# Patient Record
Sex: FEMALE | Race: WHITE | NOT HISPANIC OR LATINO | URBAN - METROPOLITAN AREA
[De-identification: names, ages, dates, MRNs, and addresses within clinical notes are randomized per-mention and may not be internally consistent; named-entity substitution may affect disease eponyms.]

---

## 2020-10-01 ENCOUNTER — OFFICE VISIT (OUTPATIENT)
Dept: URBAN - METROPOLITAN AREA CLINIC 121 | Facility: CLINIC | Age: 70
End: 2020-10-01

## 2020-10-14 ENCOUNTER — OFFICE VISIT (OUTPATIENT)
Dept: URBAN - METROPOLITAN AREA CLINIC 63 | Facility: CLINIC | Age: 70
End: 2020-10-14

## 2020-11-20 ENCOUNTER — OFFICE VISIT (OUTPATIENT)
Dept: URBAN - METROPOLITAN AREA SURGERY CENTER 4 | Facility: SURGERY CENTER | Age: 70
End: 2020-11-20

## 2020-11-24 LAB — PATHOLOGY (INDENTED REPORT): (no result)

## 2020-12-10 ENCOUNTER — OFFICE VISIT (OUTPATIENT)
Dept: URBAN - METROPOLITAN AREA CLINIC 63 | Facility: CLINIC | Age: 70
End: 2020-12-10

## 2021-02-16 ENCOUNTER — OFFICE VISIT (OUTPATIENT)
Dept: URBAN - METROPOLITAN AREA CLINIC 63 | Facility: CLINIC | Age: 71
End: 2021-02-16

## 2021-05-18 ENCOUNTER — OFFICE VISIT (OUTPATIENT)
Dept: URBAN - METROPOLITAN AREA CLINIC 63 | Facility: CLINIC | Age: 71
End: 2021-05-18

## 2022-07-09 ENCOUNTER — TELEPHONE ENCOUNTER (OUTPATIENT)
Dept: URBAN - METROPOLITAN AREA CLINIC 121 | Facility: CLINIC | Age: 72
End: 2022-07-09

## 2022-07-09 RX ORDER — CLONAZEPAM 1 MG/1
TABLET, ORALLY DISINTEGRATING ORAL ONCE A DAY
Refills: 0 | OUTPATIENT
Start: 2018-06-04 | End: 2018-06-04

## 2022-07-09 RX ORDER — CLONAZEPAM 1 MG/1
TABLET ORAL
Refills: 0 | OUTPATIENT
Start: 2020-10-05 | End: 2020-10-14

## 2022-07-09 RX ORDER — RIZATRIPTAN BENZOATE 10 MG/1
TABLET ORAL
Refills: 0 | OUTPATIENT
Start: 2018-05-09 | End: 2018-06-04

## 2022-07-09 RX ORDER — FERROUS SULFATE 137(45) MG
TABLET, EXTENDED RELEASE ORAL ONCE A DAY
Refills: 0 | OUTPATIENT
Start: 2018-06-04 | End: 2018-08-27

## 2022-07-09 RX ORDER — CLONAZEPAM 1 MG/1
TABLET, ORALLY DISINTEGRATING ORAL
Refills: 0 | OUTPATIENT
Start: 2018-04-30 | End: 2018-06-04

## 2022-07-09 RX ORDER — AMITRIPTYLINE HYDROCHLORIDE 25 MG/1
TABLET, FILM COATED ORAL
Refills: 0 | OUTPATIENT
Start: 2020-08-07 | End: 2020-10-14

## 2022-07-09 RX ORDER — ROSUVASTATIN CALCIUM 20 MG/1
TABLET, FILM COATED ORAL
Refills: 0 | OUTPATIENT
Start: 2020-08-11 | End: 2020-10-14

## 2022-07-09 RX ORDER — CLONAZEPAM 1 MG/1
TABLET, ORALLY DISINTEGRATING ORAL ONCE A DAY
Refills: 0 | OUTPATIENT
Start: 2018-08-27 | End: 2020-10-13

## 2022-07-09 RX ORDER — ATORVASTATIN CALCIUM 20 MG/1
TABLET, FILM COATED ORAL
Refills: 0 | OUTPATIENT
Start: 2018-04-29 | End: 2018-06-04

## 2022-07-09 RX ORDER — DULOXETINE HCL 30 MG
CAPSULE,DELAYED RELEASE (ENTERIC COATED) ORAL ONCE A DAY
Refills: 0 | OUTPATIENT
Start: 2018-06-04 | End: 2018-08-27

## 2022-07-09 RX ORDER — FERROUS SULFATE 137(45) MG
TABLET, EXTENDED RELEASE ORAL ONCE A DAY
Refills: 0 | OUTPATIENT
Start: 2018-08-27 | End: 2020-10-14

## 2022-07-09 RX ORDER — OMEPRAZOLE 40 MG/1
CAPSULE, DELAYED RELEASE ORAL TWICE A DAY
Refills: 0 | OUTPATIENT
Start: 2018-06-04 | End: 2018-08-27

## 2022-07-09 RX ORDER — RIZATRIPTAN BENZOATE 10 MG/1
TABLET ORAL AS NEEDED
Refills: 0 | OUTPATIENT
Start: 2018-08-27 | End: 2020-10-14

## 2022-07-09 RX ORDER — FAMOTIDINE 20 MG/1
ONCE A DAY AT NIGHT TABLET ORAL ONCE A DAY
Refills: 2 | OUTPATIENT
Start: 2020-12-10 | End: 2021-03-08

## 2022-07-09 RX ORDER — RIZATRIPTAN BENZOATE 10 MG/1
TABLET ORAL AS NEEDED
Refills: 0 | OUTPATIENT
Start: 2018-06-04 | End: 2018-08-27

## 2022-07-09 RX ORDER — ESOMEPRAZOLE MAGNESIUM 40 MG/1
FOR SUSPENSION ORAL
Refills: 0 | OUTPATIENT
Start: 2020-09-23 | End: 2020-10-14

## 2022-07-09 RX ORDER — OMEPRAZOLE 40 MG/1
CAPSULE, DELAYED RELEASE ORAL TWICE A DAY
Refills: 0 | OUTPATIENT
Start: 2018-08-27 | End: 2020-10-14

## 2022-07-09 RX ORDER — CLONAZEPAM 1 MG/1
TABLET, ORALLY DISINTEGRATING ORAL ONCE A DAY
Refills: 0 | OUTPATIENT
Start: 2018-06-04 | End: 2018-08-27

## 2022-07-09 RX ORDER — ATORVASTATIN CALCIUM 20 MG/1
TABLET, FILM COATED ORAL ONCE A DAY
Refills: 0 | OUTPATIENT
Start: 2018-08-27 | End: 2020-10-14

## 2022-07-09 RX ORDER — AMITRIPTYLINE HYDROCHLORIDE 50 MG/1
TABLET, FILM COATED ORAL
Refills: 0 | OUTPATIENT
Start: 2020-09-17 | End: 2020-10-14

## 2022-07-09 RX ORDER — ATORVASTATIN CALCIUM 20 MG/1
TABLET, FILM COATED ORAL ONCE A DAY
Refills: 0 | OUTPATIENT
Start: 2018-06-04 | End: 2018-08-27

## 2022-07-09 RX ORDER — DULOXETINE HCL 30 MG
CAPSULE,DELAYED RELEASE (ENTERIC COATED) ORAL ONCE A DAY
Refills: 0 | OUTPATIENT
Start: 2018-08-27 | End: 2020-10-14

## 2022-07-10 ENCOUNTER — TELEPHONE ENCOUNTER (OUTPATIENT)
Dept: URBAN - METROPOLITAN AREA CLINIC 121 | Facility: CLINIC | Age: 72
End: 2022-07-10

## 2022-07-10 RX ORDER — CLONAZEPAM 1 MG/1
TABLET ORAL
Refills: 0 | Status: ACTIVE | COMMUNITY
Start: 2020-10-14

## 2022-07-10 RX ORDER — FAMOTIDINE 20 MG/1
ONCE A DAY AT NIGHT TABLET ORAL ONCE A DAY
Refills: 2 | Status: ACTIVE | COMMUNITY
Start: 2021-03-08

## 2022-07-10 RX ORDER — TOPIRAMATE 50 MG/1
TABLET ORAL
Refills: 0 | Status: ACTIVE | COMMUNITY
Start: 2020-10-14

## 2022-07-10 RX ORDER — ROSUVASTATIN CALCIUM 20 MG/1
TABLET, FILM COATED ORAL
Refills: 0 | Status: ACTIVE | COMMUNITY
Start: 2020-10-14

## 2022-07-10 RX ORDER — ESOMEPRAZOLE MAGNESIUM 40 MG/1
FOR SUSPENSION ORAL
Refills: 0 | Status: ACTIVE | COMMUNITY
Start: 2020-10-14

## 2023-01-20 ENCOUNTER — OFFICE VISIT (OUTPATIENT)
Dept: URBAN - METROPOLITAN AREA CLINIC 60 | Facility: CLINIC | Age: 73
End: 2023-01-20

## 2023-01-20 ENCOUNTER — WEB ENCOUNTER (OUTPATIENT)
Dept: URBAN - METROPOLITAN AREA CLINIC 60 | Facility: CLINIC | Age: 73
End: 2023-01-20

## 2023-01-20 ENCOUNTER — LAB OUTSIDE AN ENCOUNTER (OUTPATIENT)
Dept: URBAN - METROPOLITAN AREA CLINIC 60 | Facility: CLINIC | Age: 73
End: 2023-01-20

## 2023-01-20 ENCOUNTER — CLAIMS CREATED FROM THE CLAIM WINDOW (OUTPATIENT)
Dept: URBAN - METROPOLITAN AREA CLINIC 60 | Facility: CLINIC | Age: 73
End: 2023-01-20
Payer: MEDICARE

## 2023-01-20 ENCOUNTER — CLAIMS CREATED FROM THE CLAIM WINDOW (OUTPATIENT)
Dept: URBAN - METROPOLITAN AREA CLINIC 60 | Facility: CLINIC | Age: 73
End: 2023-01-20

## 2023-01-20 VITALS
HEART RATE: 68 BPM | HEIGHT: 63 IN | WEIGHT: 126.6 LBS | TEMPERATURE: 98.4 F | SYSTOLIC BLOOD PRESSURE: 112 MMHG | BODY MASS INDEX: 22.43 KG/M2 | OXYGEN SATURATION: 98 % | DIASTOLIC BLOOD PRESSURE: 60 MMHG

## 2023-01-20 DIAGNOSIS — K64.9 HEMORRHOIDS, UNSPECIFIED HEMORRHOID TYPE: ICD-10-CM

## 2023-01-20 DIAGNOSIS — K21.00 GASTROESOPHAGEAL REFLUX DISEASE WITH ESOPHAGITIS WITHOUT HEMORRHAGE: ICD-10-CM

## 2023-01-20 DIAGNOSIS — Z86.010 PERSONAL HISTORY OF COLONIC POLYPS: ICD-10-CM

## 2023-01-20 DIAGNOSIS — K22.70 BARRETT'S ESOPHAGUS WITHOUT DYSPLASIA: ICD-10-CM

## 2023-01-20 DIAGNOSIS — K58.2 IRRITABLE BOWEL SYNDROME WITH BOTH CONSTIPATION AND DIARRHEA: ICD-10-CM

## 2023-01-20 PROCEDURE — 99214 OFFICE O/P EST MOD 30 MIN: CPT | Performed by: PHYSICIAN ASSISTANT

## 2023-01-20 RX ORDER — FAMOTIDINE 20 MG/1
ONCE A DAY AT NIGHT TABLET ORAL ONCE A DAY
Refills: 2 | Status: ACTIVE | COMMUNITY
Start: 2021-03-08

## 2023-01-20 RX ORDER — ROSUVASTATIN CALCIUM 20 MG/1
TABLET, FILM COATED ORAL
Refills: 0 | Status: ACTIVE | COMMUNITY
Start: 2020-10-14

## 2023-01-20 RX ORDER — CLONAZEPAM 1 MG/1
TABLET ORAL
Refills: 0 | Status: ACTIVE | COMMUNITY
Start: 2020-10-14

## 2023-01-20 RX ORDER — PRENATAL 168/IRON/FOLIC/OMEGA3 27-800-235
AS DIRECTED CAPSULE ORAL
Status: ACTIVE | COMMUNITY

## 2023-01-20 RX ORDER — MULTIVITAMIN WITH IRON
1 TABLET WITH A MEAL TABLET ORAL ONCE A DAY
Status: ACTIVE | COMMUNITY

## 2023-01-20 RX ORDER — SERTRALINE 50 MG/1
1 TABLET TABLET, FILM COATED ORAL ONCE A DAY
Status: ACTIVE | COMMUNITY

## 2023-01-20 RX ORDER — ESOMEPRAZOLE MAGNESIUM 40 MG/1
FOR SUSPENSION ORAL
Refills: 0 | Status: ACTIVE | COMMUNITY
Start: 2020-10-14

## 2023-01-20 NOTE — HPI-TODAY'S VISIT:
72-year-old female with history of Valentin's esophagus, colon polyps, and family history of colon cancer in her mother presents to the office for evaluation of GERD symptoms. She has a history of chronic heartburn which became more severe in 2020 and she was started on Nexium 40 mg daily in the morning and famotidine 20 mg daily at night.    She stateas she has been having increased heartburn about 3 months ago. She saw her ENT who told her it was due to her reflux. States she has a lot of belching and bloating. She has heartburn on most days. States her throat is sore and she will also have some pain in her ears. She has no dysphagia. No unintentional weight loss.   She reports a history of IBS with alternating bowel habits. Sometimes she has constipation and will insert her finger into her vagina to "push out" her stools. She has no rectal bleeding. States she has tried fiber supplements in the past. She has occasional flares.   Her last EGD and colonoscopy were done in November 2020.  Her EGD demonstrated salmon-colored mucosa in the proximal esophagus consistent with gastric inlet patch.  [place]-colored mucosa was present at the GE junction.  Mildly erythematous mucosa was found in the stomach.  The duodenum appeared normal.  Her colonoscopy demonstrated grade 1 internal hemorrhoids, 4 mm tubular adenoma which was removed from the ascending colon, tortuous colon.

## 2023-01-31 ENCOUNTER — OFFICE VISIT (OUTPATIENT)
Dept: URBAN - METROPOLITAN AREA SURGERY CENTER 4 | Facility: SURGERY CENTER | Age: 73
End: 2023-01-31
Payer: MEDICARE

## 2023-01-31 ENCOUNTER — TELEPHONE ENCOUNTER (OUTPATIENT)
Dept: URBAN - METROPOLITAN AREA CLINIC 63 | Facility: CLINIC | Age: 73
End: 2023-01-31

## 2023-01-31 DIAGNOSIS — K22.70 BARRETT'S ESOPHAGUS WITHOUT DYSPLASIA: ICD-10-CM

## 2023-01-31 DIAGNOSIS — K29.70 GASTRITIS: ICD-10-CM

## 2023-01-31 PROCEDURE — 43239 EGD BIOPSY SINGLE/MULTIPLE: CPT | Performed by: INTERNAL MEDICINE

## 2023-01-31 RX ORDER — MULTIVITAMIN WITH IRON
1 TABLET WITH A MEAL TABLET ORAL ONCE A DAY
Status: ACTIVE | COMMUNITY

## 2023-01-31 RX ORDER — PRENATAL 168/IRON/FOLIC/OMEGA3 27-800-235
AS DIRECTED CAPSULE ORAL
Status: ACTIVE | COMMUNITY

## 2023-01-31 RX ORDER — ESOMEPRAZOLE MAGNESIUM 40 MG/1
FOR SUSPENSION ORAL
Refills: 0 | Status: ACTIVE | COMMUNITY
Start: 2020-10-14

## 2023-01-31 RX ORDER — FAMOTIDINE 20 MG/1
ONCE A DAY AT NIGHT TABLET ORAL ONCE A DAY
Refills: 2 | Status: ACTIVE | COMMUNITY
Start: 2021-03-08

## 2023-01-31 RX ORDER — PANTOPRAZOLE SODIUM 40 MG/1
1 TABLET TABLET, DELAYED RELEASE ORAL ONCE A DAY
Qty: 30 | Refills: 2 | OUTPATIENT
Start: 2023-01-31

## 2023-01-31 RX ORDER — CLONAZEPAM 1 MG/1
TABLET ORAL
Refills: 0 | Status: ACTIVE | COMMUNITY
Start: 2020-10-14

## 2023-01-31 RX ORDER — ROSUVASTATIN CALCIUM 20 MG/1
TABLET, FILM COATED ORAL
Refills: 0 | Status: ACTIVE | COMMUNITY
Start: 2020-10-14

## 2023-01-31 RX ORDER — SERTRALINE 50 MG/1
1 TABLET TABLET, FILM COATED ORAL ONCE A DAY
Status: ACTIVE | COMMUNITY

## 2023-02-01 PROBLEM — 4556007 GASTRITIS: Status: ACTIVE | Noted: 2023-02-01

## 2023-02-24 ENCOUNTER — OFFICE VISIT (OUTPATIENT)
Dept: URBAN - METROPOLITAN AREA CLINIC 60 | Facility: CLINIC | Age: 73
End: 2023-02-24
Payer: MEDICARE

## 2023-02-24 VITALS
WEIGHT: 130.6 LBS | HEIGHT: 63 IN | DIASTOLIC BLOOD PRESSURE: 63 MMHG | TEMPERATURE: 98 F | OXYGEN SATURATION: 97 % | SYSTOLIC BLOOD PRESSURE: 104 MMHG | BODY MASS INDEX: 23.14 KG/M2 | HEART RATE: 55 BPM | RESPIRATION RATE: 12 BRPM

## 2023-02-24 DIAGNOSIS — K58.2 IRRITABLE BOWEL SYNDROME WITH BOTH CONSTIPATION AND DIARRHEA: ICD-10-CM

## 2023-02-24 DIAGNOSIS — Z86.010 PERSONAL HISTORY OF COLONIC POLYPS: ICD-10-CM

## 2023-02-24 DIAGNOSIS — K22.70 BARRETT'S ESOPHAGUS WITHOUT DYSPLASIA: ICD-10-CM

## 2023-02-24 DIAGNOSIS — K21.00 GASTROESOPHAGEAL REFLUX DISEASE WITH ESOPHAGITIS WITHOUT HEMORRHAGE: ICD-10-CM

## 2023-02-24 PROBLEM — 428283002: Status: ACTIVE | Noted: 2023-01-20

## 2023-02-24 PROBLEM — 266433003: Status: ACTIVE | Noted: 2023-01-20

## 2023-02-24 PROBLEM — 10743008: Status: ACTIVE | Noted: 2023-01-20

## 2023-02-24 PROCEDURE — 99214 OFFICE O/P EST MOD 30 MIN: CPT | Performed by: PHYSICIAN ASSISTANT

## 2023-02-24 RX ORDER — PRENATAL 168/IRON/FOLIC/OMEGA3 27-800-235
AS DIRECTED CAPSULE ORAL
Status: ACTIVE | COMMUNITY

## 2023-02-24 RX ORDER — SERTRALINE 50 MG/1
1 TABLET TABLET, FILM COATED ORAL ONCE A DAY
Status: ACTIVE | COMMUNITY

## 2023-02-24 RX ORDER — RIZATRIPTAN BENZOATE 10 MG/1
TAKE ONE TABLET BY MOUTH ONE TIME DAILY FOR MIGRAINES TABLET ORAL
Qty: 18 UNSPECIFIED | Refills: 0 | Status: ACTIVE | COMMUNITY

## 2023-02-24 RX ORDER — MELOXICAM 7.5 MG/1
TAKE 1 TABLET BY MOUTH DAILY AS NEEDED FOR PAIN TABLET ORAL
Qty: 30 EACH | Refills: 1 | Status: ACTIVE | COMMUNITY

## 2023-02-24 RX ORDER — ROSUVASTATIN CALCIUM 20 MG/1
TABLET, FILM COATED ORAL
Refills: 0 | Status: ACTIVE | COMMUNITY
Start: 2020-10-14

## 2023-02-24 RX ORDER — TIZANIDINE 4 MG/1
TAKE 1 TABLET BY MOUTH EVERY NIGHT AT BEDTIME AS NEEDED FOR PAIN TABLET ORAL
Qty: 30 EACH | Refills: 0 | Status: ACTIVE | COMMUNITY

## 2023-02-24 RX ORDER — FLUTICASONE PROPIONATE 50 UG/1
SPRAY, METERED NASAL
Qty: 48 GRAM | Status: ACTIVE | COMMUNITY

## 2023-02-24 RX ORDER — MULTIVITAMIN WITH IRON
1 TABLET WITH A MEAL TABLET ORAL ONCE A DAY
Status: ACTIVE | COMMUNITY

## 2023-02-24 RX ORDER — CLONAZEPAM 1 MG/1
TABLET ORAL
Refills: 0 | Status: ACTIVE | COMMUNITY
Start: 2020-10-14

## 2023-02-24 RX ORDER — FAMOTIDINE 20 MG/1
ONCE A DAY AT NIGHT TABLET ORAL ONCE A DAY
Refills: 2 | Status: ACTIVE | COMMUNITY
Start: 2021-03-08

## 2023-02-24 RX ORDER — ALENDRONATE SODIUM 70 MG/1
TABLET ORAL
Qty: 12 TABLET | Status: ACTIVE | COMMUNITY

## 2023-02-24 RX ORDER — FLUCONAZOLE 150 MG/1
TAKE ONE TABLET BY MOUTH AS A SINGLE ORAL DOSE TABLET ORAL
Qty: 1 UNSPECIFIED | Refills: 0 | Status: ACTIVE | COMMUNITY

## 2023-02-24 RX ORDER — PANTOPRAZOLE SODIUM 40 MG/1
1 TABLET TABLET, DELAYED RELEASE ORAL TWICE A DAY
Qty: 60 | Refills: 6
Start: 2023-01-31

## 2023-02-24 RX ORDER — TOPIRAMATE 50 MG/1
TABLET, FILM COATED ORAL
Qty: 90 TABLET | Status: ACTIVE | COMMUNITY

## 2023-02-24 RX ORDER — HYDROXYZINE HYDROCHLORIDE 25 MG/1
TAKE ONE TABLET BY MOUTH AT BEDTIME TABLET, FILM COATED ORAL
Qty: 30 UNSPECIFIED | Refills: 0 | Status: ACTIVE | COMMUNITY

## 2023-02-24 RX ORDER — PANTOPRAZOLE SODIUM 40 MG/1
1 TABLET TABLET, DELAYED RELEASE ORAL ONCE A DAY
Qty: 30 | Refills: 2 | Status: ACTIVE | COMMUNITY
Start: 2023-01-31

## 2023-02-24 NOTE — HPI-TODAY'S VISIT:
72-year-old female with history of Valentin's esophagus, colon polyps, family history of colon cancer in her mother presented to the office last month for evaluation of GERD symptoms.  She reported a history of chronic heartburn which became more severe in 2020 and she was started on Nexium 40 mg daily in the morning and famotidine 40 mg at night.  Her heartburn worsened about 3 months ago.  She saw her ENT who told her it was due to reflux.  She reported a lot of belching and bloating and was having heartburn on most days.  She reported sore throat which was also causing pain in her ears. She has a longstanding history of IBS with alternating bowel habits.  She reported that sometimes she has constipation and will need to insert her finger into her vagina to "push out" her stools.  She denied any rectal bleeding.  She has tried fiber supplements in the past.  She has occasional flares of hemorrhoids. She was advised to start daily fiber therapy with Metamucil.   EGD was done on January 31, 2023.  Her EGD demonstrated salmon-colored mucosa in the proximal esophagus.  [place]-colored mucosal was found in the distal esophagus measuring between 1 to 2 cm in length.  Lower esophageal biopsy showed mild chronic nonspecific inflammation.  No intestinal metaplasia was identified.  Mild inflammation was found in the prepyloric region of the stomach.  Gastric antral biopsy was negative for H. pylori.  The mucosa was normal in the gastric fundus and gastric body.  The duodenum appeared normal.   She follows up doing well.  She had no problems following her procedure. She reports frequent GERD symptoms. She has frequent heartburn and feels buring up into her ears. No dysphgia or odynophagia. No other GI complaints.   Her last colonoscopy was done in November 2020.  Her colonoscopy demonstrated grade 1 internal hemorrhoids, 4 mm tubular adenoma which was removed from the ascending colon, tortuous colon.

## 2023-05-01 ENCOUNTER — ERX REFILL RESPONSE (OUTPATIENT)
Dept: URBAN - METROPOLITAN AREA CLINIC 63 | Facility: CLINIC | Age: 73
End: 2023-05-01

## 2023-05-01 RX ORDER — PANTOPRAZOLE SODIUM 40 MG/1
1 TABLET TABLET, DELAYED RELEASE ORAL TWICE A DAY
Qty: 60 | Refills: 3 | OUTPATIENT

## 2023-05-01 RX ORDER — PANTOPRAZOLE SODIUM 40 MG/1
1 TABLET TABLET, DELAYED RELEASE ORAL TWICE A DAY
Qty: 60 | Refills: 6 | OUTPATIENT

## 2023-05-24 ENCOUNTER — OFFICE VISIT (OUTPATIENT)
Dept: URBAN - METROPOLITAN AREA CLINIC 60 | Facility: CLINIC | Age: 73
End: 2023-05-24

## 2023-11-20 ENCOUNTER — LAB OUTSIDE AN ENCOUNTER (OUTPATIENT)
Dept: URBAN - METROPOLITAN AREA CLINIC 63 | Facility: CLINIC | Age: 73
End: 2023-11-20

## 2023-11-20 ENCOUNTER — TELEPHONE ENCOUNTER (OUTPATIENT)
Dept: URBAN - METROPOLITAN AREA CLINIC 63 | Facility: CLINIC | Age: 73
End: 2023-11-20

## 2023-11-20 ENCOUNTER — OFFICE VISIT (OUTPATIENT)
Dept: URBAN - METROPOLITAN AREA CLINIC 63 | Facility: CLINIC | Age: 73
End: 2023-11-20

## 2023-11-20 RX ORDER — ROSUVASTATIN CALCIUM 20 MG/1
TABLET, FILM COATED ORAL
Refills: 0 | Status: ACTIVE | COMMUNITY
Start: 2020-10-14

## 2023-11-20 RX ORDER — TIZANIDINE 4 MG/1
TAKE 1 TABLET BY MOUTH EVERY NIGHT AT BEDTIME AS NEEDED FOR PAIN TABLET ORAL
Qty: 30 EACH | Refills: 0 | Status: ACTIVE | COMMUNITY

## 2023-11-20 RX ORDER — SERTRALINE 50 MG/1
1 TABLET TABLET, FILM COATED ORAL ONCE A DAY
Status: ACTIVE | COMMUNITY

## 2023-11-20 RX ORDER — FAMOTIDINE 20 MG/1
ONCE A DAY AT NIGHT TABLET ORAL ONCE A DAY
Refills: 2 | Status: ACTIVE | COMMUNITY
Start: 2021-03-08

## 2023-11-20 RX ORDER — PRENATAL 168/IRON/FOLIC/OMEGA3 27-800-235
AS DIRECTED CAPSULE ORAL
Status: ACTIVE | COMMUNITY

## 2023-11-20 RX ORDER — CLONAZEPAM 1 MG/1
TABLET ORAL
Refills: 0 | Status: ACTIVE | COMMUNITY
Start: 2020-10-14

## 2023-11-20 RX ORDER — FLUCONAZOLE 150 MG/1
TAKE ONE TABLET BY MOUTH AS A SINGLE ORAL DOSE TABLET ORAL
Qty: 1 UNSPECIFIED | Refills: 0 | Status: ACTIVE | COMMUNITY

## 2023-11-20 RX ORDER — MULTIVITAMIN WITH IRON
1 TABLET WITH A MEAL TABLET ORAL ONCE A DAY
Status: ACTIVE | COMMUNITY

## 2023-11-20 RX ORDER — RIZATRIPTAN BENZOATE 10 MG/1
TAKE ONE TABLET BY MOUTH ONE TIME DAILY FOR MIGRAINES TABLET ORAL
Qty: 18 UNSPECIFIED | Refills: 0 | Status: ACTIVE | COMMUNITY

## 2023-11-20 RX ORDER — HYDROXYZINE HYDROCHLORIDE 25 MG/1
TAKE ONE TABLET BY MOUTH AT BEDTIME TABLET, FILM COATED ORAL
Qty: 30 UNSPECIFIED | Refills: 0 | Status: ACTIVE | COMMUNITY

## 2023-11-20 RX ORDER — FLUTICASONE PROPIONATE 50 UG/1
SPRAY, METERED NASAL
Qty: 48 GRAM | Status: ACTIVE | COMMUNITY

## 2023-11-20 RX ORDER — ALENDRONATE SODIUM 70 MG/1
TABLET ORAL
Qty: 12 TABLET | Status: ACTIVE | COMMUNITY

## 2023-11-20 RX ORDER — PANTOPRAZOLE SODIUM 40 MG/1
1 TABLET TABLET, DELAYED RELEASE ORAL TWICE A DAY
Qty: 60 | Refills: 3 | Status: ACTIVE | COMMUNITY

## 2023-11-20 RX ORDER — TOPIRAMATE 50 MG/1
TABLET, FILM COATED ORAL
Qty: 90 TABLET | Status: ACTIVE | COMMUNITY

## 2023-11-20 RX ORDER — MELOXICAM 7.5 MG/1
TAKE 1 TABLET BY MOUTH DAILY AS NEEDED FOR PAIN TABLET ORAL
Qty: 30 EACH | Refills: 1 | Status: ACTIVE | COMMUNITY

## 2023-12-20 ENCOUNTER — OFFICE VISIT (OUTPATIENT)
Dept: URBAN - METROPOLITAN AREA CLINIC 60 | Facility: CLINIC | Age: 73
End: 2023-12-20
Payer: MEDICARE

## 2023-12-20 ENCOUNTER — LAB OUTSIDE AN ENCOUNTER (OUTPATIENT)
Dept: URBAN - METROPOLITAN AREA CLINIC 60 | Facility: CLINIC | Age: 73
End: 2023-12-20

## 2023-12-20 VITALS
HEART RATE: 51 BPM | DIASTOLIC BLOOD PRESSURE: 68 MMHG | TEMPERATURE: 98.2 F | OXYGEN SATURATION: 98 % | BODY MASS INDEX: 23.12 KG/M2 | SYSTOLIC BLOOD PRESSURE: 100 MMHG | HEIGHT: 63 IN | WEIGHT: 130.5 LBS

## 2023-12-20 DIAGNOSIS — K59.1 FUNCTIONAL DIARRHEA: ICD-10-CM

## 2023-12-20 DIAGNOSIS — K58.2 IRRITABLE BOWEL SYNDROME WITH BOTH CONSTIPATION AND DIARRHEA: ICD-10-CM

## 2023-12-20 DIAGNOSIS — Z86.010 PERSONAL HISTORY OF COLONIC POLYPS: ICD-10-CM

## 2023-12-20 DIAGNOSIS — R13.19 ESOPHAGEAL DYSPHAGIA: ICD-10-CM

## 2023-12-20 DIAGNOSIS — D64.89 ANEMIA DUE TO OTHER CAUSE, NOT CLASSIFIED: ICD-10-CM

## 2023-12-20 DIAGNOSIS — K22.70 BARRETT'S ESOPHAGUS WITHOUT DYSPLASIA: ICD-10-CM

## 2023-12-20 DIAGNOSIS — K21.00 GASTROESOPHAGEAL REFLUX DISEASE WITH ESOPHAGITIS WITHOUT HEMORRHAGE: ICD-10-CM

## 2023-12-20 PROCEDURE — 99214 OFFICE O/P EST MOD 30 MIN: CPT | Performed by: PHYSICIAN ASSISTANT

## 2023-12-20 RX ORDER — MELOXICAM 7.5 MG/1
TAKE 1 TABLET BY MOUTH DAILY AS NEEDED FOR PAIN TABLET ORAL
Qty: 30 EACH | Refills: 1 | Status: ACTIVE | COMMUNITY

## 2023-12-20 RX ORDER — ROSUVASTATIN CALCIUM 20 MG/1
TABLET, FILM COATED ORAL
Refills: 0 | Status: ACTIVE | COMMUNITY
Start: 2020-10-14

## 2023-12-20 RX ORDER — RIZATRIPTAN BENZOATE 10 MG/1
TAKE ONE TABLET BY MOUTH ONE TIME DAILY FOR MIGRAINES TABLET ORAL
Qty: 18 UNSPECIFIED | Refills: 0 | Status: ACTIVE | COMMUNITY

## 2023-12-20 RX ORDER — TOPIRAMATE 50 MG/1
TABLET, FILM COATED ORAL
Qty: 90 TABLET | Status: ACTIVE | COMMUNITY

## 2023-12-20 RX ORDER — PRENATAL 168/IRON/FOLIC/OMEGA3 27-800-235
AS DIRECTED CAPSULE ORAL
Status: ACTIVE | COMMUNITY

## 2023-12-20 RX ORDER — FLUCONAZOLE 150 MG/1
TAKE ONE TABLET BY MOUTH AS A SINGLE ORAL DOSE TABLET ORAL
Qty: 1 UNSPECIFIED | Refills: 0 | Status: ACTIVE | COMMUNITY

## 2023-12-20 RX ORDER — ESOMEPRAZOLE MAGNESIUM 40 MG/1
1 CAPSULE CAPSULE, DELAYED RELEASE ORAL TWICE A DAY
Qty: 60 CAPSULE | Refills: 5 | OUTPATIENT
Start: 2023-12-20

## 2023-12-20 RX ORDER — MULTIVITAMIN WITH IRON
1 TABLET WITH A MEAL TABLET ORAL ONCE A DAY
Status: ACTIVE | COMMUNITY

## 2023-12-20 RX ORDER — FLUTICASONE PROPIONATE 50 UG/1
SPRAY, METERED NASAL
Qty: 48 GRAM | Status: ACTIVE | COMMUNITY

## 2023-12-20 RX ORDER — SERTRALINE 50 MG/1
1 TABLET TABLET, FILM COATED ORAL ONCE A DAY
Status: ACTIVE | COMMUNITY

## 2023-12-20 RX ORDER — HYDROXYZINE HYDROCHLORIDE 25 MG/1
TAKE ONE TABLET BY MOUTH AT BEDTIME TABLET, FILM COATED ORAL
Qty: 30 UNSPECIFIED | Refills: 0 | Status: ACTIVE | COMMUNITY

## 2023-12-20 RX ORDER — CLONAZEPAM 1 MG/1
TABLET ORAL
Refills: 0 | Status: ACTIVE | COMMUNITY
Start: 2020-10-14

## 2023-12-20 RX ORDER — PANTOPRAZOLE SODIUM 40 MG/1
1 TABLET TABLET, DELAYED RELEASE ORAL TWICE A DAY
Qty: 60 | Refills: 3 | Status: ACTIVE | COMMUNITY

## 2023-12-20 NOTE — HPI-TODAY'S VISIT:
73-year-old female with history of Valentin's esophagus, colon polyps, family history of colon cancer in her mother presents to the office for follow-up. She was previously seen in the office in January 2023 with complaints of GERD symptoms.  She was started on Nexium 40 mg daily and famotidine 40 mg at night in 2020.  She reported that her heartburn had worsened about 3 months prior to her initial office visit.  She saw her ENT specialist who told her that her symptoms were due to reflux.  She reported a lot of belching and bloating and was having heartburn on most days.  She also reported a sore throat which was also causing pain in her ears. She has a longstanding history of IBS with alternating bowel habits.  She reported that sometimes she would have constipation and would need to insert her fingers into her vagina to "push out" her stools.  She denied any rectal bleeding.  She tried fiber supplements in the past without improvement.  She also reported occasional flares of hemorrhoids. She was advised to start daily fiber therapy with Metamucil. EGD was done January 31, 2023.  Her EGD demonstrated salmon-colored mucosa in the proximal esophagus.  [place]-colored mucosa was found in the distal esophagus measuring between 1 to 2 cm in length.  Lower esophageal biopsy showed mild chronic nonspecific inflammation.  No intestinal metaplasia.  Mild inflammation was found in the prepyloric region of the stomach.  Gastric antral biopsy was negative for H. pylori.  The mucosa was normal in the gastric fundus and body.  The duodenum appeared normal. She was last seen in the office in February 2023 following her procedures.  She reported frequent GERD symptoms in the form of frequent heartburn.  She also reported feeling burning up into her ears.  She denied any dysphagia or odynophagia.  She had no other GI complaints.  Her pantoprazole was increased to 40 mg twice a day.  Antireflux diet and lifestyle was recommended.  She follows up today with bitter acid taste in her mouth. She had a barium swallow on 11/13/23 which demonstrated moderate GERD. No stricture or mass was noted. She reports  recent choking episodes with rice, yogurt, and once when eating a chicken sandwich. She feels foods sticking in the upper esophagus. She also has dysphagia when she swallows her pills. She swallows liquids fine. She also has cough and voice hoarseness. She follows an anti reflux diet and lifestyle.   She has complaints of diarrhea over a few months. States this caused hemorrhoidal irritation. Her diarrhea began after she was started on magnesium supplements for headaches. She cut the magnesium in half which did not help. She stopped taking the magnesium and then her stools were type 4 on the BSS. She states she has insomnia and magnesium was also helping with her sleep. Denies any blood in the stool. States she has infrequent generalized abdominal discomfort. Denies any weight loss.   She states she was recently diagnosed with mild anemia and was started on iron supplements by her PCP. Denies any overt GI bleeding.    Last colonoscopy was done in November 2020.  Her colonoscopy demonstrated grade 1 internal hemorrhoids, 4 mm tubular adenoma which was removed from the ascending colon, tortuous colon.

## 2024-01-30 ENCOUNTER — CLAIMS CREATED FROM THE CLAIM WINDOW (OUTPATIENT)
Dept: URBAN - METROPOLITAN AREA CLINIC 4 | Facility: CLINIC | Age: 74
End: 2024-01-30
Payer: MEDICARE

## 2024-01-30 ENCOUNTER — OUT OF OFFICE VISIT (OUTPATIENT)
Dept: URBAN - METROPOLITAN AREA SURGERY CENTER 4 | Facility: SURGERY CENTER | Age: 74
End: 2024-01-30
Payer: MEDICARE

## 2024-01-30 DIAGNOSIS — D50.9 IRON DEFICIENCY ANEMIA, UNSPECIFIED IRON DEFICIENCY ANEMIA TYPE: ICD-10-CM

## 2024-01-30 DIAGNOSIS — D50.9 ANEMIA, IRON DEFICIENCY: ICD-10-CM

## 2024-01-30 DIAGNOSIS — K22.89 OTHER SPECIFIED DISEASE OF ESOPHAGUS: ICD-10-CM

## 2024-01-30 DIAGNOSIS — K64.0 FIRST DEGREE HEMORRHOIDS: ICD-10-CM

## 2024-01-30 DIAGNOSIS — K29.70 GASTRITIS: ICD-10-CM

## 2024-01-30 DIAGNOSIS — Q43.8 TORTUOUS COLON: ICD-10-CM

## 2024-01-30 DIAGNOSIS — K22.70 BARRETT'S ESOPHAGUS: ICD-10-CM

## 2024-01-30 DIAGNOSIS — K29.70 GASTRITIS WITHOUT BLEEDING, UNSPECIFIED CHRONICITY, UNSPECIFIED GASTRITIS TYPE: ICD-10-CM

## 2024-01-30 DIAGNOSIS — K21.9 GASTRO-ESOPHAGEAL REFLUX DISEASE WITHOUT ESOPHAGITIS: ICD-10-CM

## 2024-01-30 DIAGNOSIS — R13.10 DYSPHAGIA, UNSPECIFIED TYPE: ICD-10-CM

## 2024-01-30 PROCEDURE — 88313 SPECIAL STAINS GROUP 2: CPT | Performed by: PATHOLOGY

## 2024-01-30 PROCEDURE — 45378 DIAGNOSTIC COLONOSCOPY: CPT | Performed by: INTERNAL MEDICINE

## 2024-01-30 PROCEDURE — 43450 DILATE ESOPHAGUS 1/MULT PASS: CPT | Performed by: INTERNAL MEDICINE

## 2024-01-30 PROCEDURE — 00813 ANES UPR LWR GI NDSC PX: CPT | Performed by: NURSE ANESTHETIST, CERTIFIED REGISTERED

## 2024-01-30 PROCEDURE — 43239 EGD BIOPSY SINGLE/MULTIPLE: CPT | Performed by: INTERNAL MEDICINE

## 2024-01-30 PROCEDURE — 88305 TISSUE EXAM BY PATHOLOGIST: CPT | Performed by: PATHOLOGY

## 2024-01-30 RX ORDER — SERTRALINE 50 MG/1
1 TABLET TABLET, FILM COATED ORAL ONCE A DAY
Status: ACTIVE | COMMUNITY

## 2024-01-30 RX ORDER — ROSUVASTATIN CALCIUM 20 MG/1
TABLET, FILM COATED ORAL
Refills: 0 | Status: ACTIVE | COMMUNITY
Start: 2020-10-14

## 2024-01-30 RX ORDER — FLUCONAZOLE 150 MG/1
TAKE ONE TABLET BY MOUTH AS A SINGLE ORAL DOSE TABLET ORAL
Qty: 1 UNSPECIFIED | Refills: 0 | Status: ACTIVE | COMMUNITY

## 2024-01-30 RX ORDER — TOPIRAMATE 50 MG/1
TABLET, FILM COATED ORAL
Qty: 90 TABLET | Status: ACTIVE | COMMUNITY

## 2024-01-30 RX ORDER — ESOMEPRAZOLE MAGNESIUM 40 MG/1
1 CAPSULE CAPSULE, DELAYED RELEASE ORAL TWICE A DAY
Qty: 60 CAPSULE | Refills: 5 | Status: ACTIVE | COMMUNITY
Start: 2023-12-20

## 2024-01-30 RX ORDER — RIZATRIPTAN BENZOATE 10 MG/1
TAKE ONE TABLET BY MOUTH ONE TIME DAILY FOR MIGRAINES TABLET ORAL
Qty: 18 UNSPECIFIED | Refills: 0 | Status: ACTIVE | COMMUNITY

## 2024-01-30 RX ORDER — MULTIVITAMIN WITH IRON
1 TABLET WITH A MEAL TABLET ORAL ONCE A DAY
Status: ACTIVE | COMMUNITY

## 2024-01-30 RX ORDER — FLUTICASONE PROPIONATE 50 UG/1
SPRAY, METERED NASAL
Qty: 48 GRAM | Status: ACTIVE | COMMUNITY

## 2024-01-30 RX ORDER — HYDROXYZINE HYDROCHLORIDE 25 MG/1
TAKE ONE TABLET BY MOUTH AT BEDTIME TABLET, FILM COATED ORAL
Qty: 30 UNSPECIFIED | Refills: 0 | Status: ACTIVE | COMMUNITY

## 2024-01-30 RX ORDER — CLONAZEPAM 1 MG/1
TABLET ORAL
Refills: 0 | Status: ACTIVE | COMMUNITY
Start: 2020-10-14

## 2024-01-30 RX ORDER — PRENATAL 168/IRON/FOLIC/OMEGA3 27-800-235
AS DIRECTED CAPSULE ORAL
Status: ACTIVE | COMMUNITY

## 2024-01-30 RX ORDER — MELOXICAM 7.5 MG/1
TAKE 1 TABLET BY MOUTH DAILY AS NEEDED FOR PAIN TABLET ORAL
Qty: 30 EACH | Refills: 1 | Status: ACTIVE | COMMUNITY

## 2024-01-30 RX ORDER — PANTOPRAZOLE SODIUM 40 MG/1
1 TABLET TABLET, DELAYED RELEASE ORAL TWICE A DAY
Qty: 60 | Refills: 3 | Status: ACTIVE | COMMUNITY

## 2024-02-14 ENCOUNTER — OV EP (OUTPATIENT)
Dept: URBAN - METROPOLITAN AREA CLINIC 60 | Facility: CLINIC | Age: 74
End: 2024-02-14
Payer: MEDICARE

## 2024-02-14 VITALS
HEIGHT: 63 IN | DIASTOLIC BLOOD PRESSURE: 68 MMHG | WEIGHT: 123.8 LBS | SYSTOLIC BLOOD PRESSURE: 122 MMHG | TEMPERATURE: 98.3 F | BODY MASS INDEX: 21.93 KG/M2 | HEART RATE: 62 BPM | RESPIRATION RATE: 12 BRPM | OXYGEN SATURATION: 98 %

## 2024-02-14 DIAGNOSIS — K21.00 GASTROESOPHAGEAL REFLUX DISEASE WITH ESOPHAGITIS WITHOUT HEMORRHAGE: ICD-10-CM

## 2024-02-14 DIAGNOSIS — Z86.010 PERSONAL HISTORY OF COLONIC POLYPS: ICD-10-CM

## 2024-02-14 DIAGNOSIS — K58.2 IRRITABLE BOWEL SYNDROME WITH BOTH CONSTIPATION AND DIARRHEA: ICD-10-CM

## 2024-02-14 DIAGNOSIS — K22.70 BARRETT'S ESOPHAGUS WITHOUT DYSPLASIA: ICD-10-CM

## 2024-02-14 PROCEDURE — 99214 OFFICE O/P EST MOD 30 MIN: CPT | Performed by: PHYSICIAN ASSISTANT

## 2024-02-14 RX ORDER — MULTIVITAMIN WITH IRON
1 TABLET WITH A MEAL TABLET ORAL ONCE A DAY
Status: ACTIVE | COMMUNITY

## 2024-02-14 RX ORDER — CLONAZEPAM 1 MG/1
TABLET ORAL
Refills: 0 | Status: ACTIVE | COMMUNITY
Start: 2020-10-14

## 2024-02-14 RX ORDER — TOPIRAMATE 50 MG/1
TABLET, FILM COATED ORAL
Qty: 90 TABLET | Status: ACTIVE | COMMUNITY

## 2024-02-14 RX ORDER — ESOMEPRAZOLE MAGNESIUM 40 MG/1
1 CAPSULE CAPSULE, DELAYED RELEASE ORAL TWICE A DAY
Qty: 60 CAPSULE | Refills: 5 | Status: ACTIVE | COMMUNITY
Start: 2023-12-20

## 2024-02-14 RX ORDER — FLUTICASONE PROPIONATE 50 UG/1
SPRAY, METERED NASAL
Qty: 48 GRAM | Status: ACTIVE | COMMUNITY

## 2024-02-14 RX ORDER — FLUCONAZOLE 150 MG/1
TAKE ONE TABLET BY MOUTH AS A SINGLE ORAL DOSE TABLET ORAL
Qty: 1 UNSPECIFIED | Refills: 0 | Status: ACTIVE | COMMUNITY

## 2024-02-14 RX ORDER — SERTRALINE 50 MG/1
1 TABLET TABLET, FILM COATED ORAL ONCE A DAY
Status: ACTIVE | COMMUNITY

## 2024-02-14 RX ORDER — PRENATAL 168/IRON/FOLIC/OMEGA3 27-800-235
AS DIRECTED CAPSULE ORAL
Status: ACTIVE | COMMUNITY

## 2024-02-14 RX ORDER — MELOXICAM 7.5 MG/1
TAKE 1 TABLET BY MOUTH DAILY AS NEEDED FOR PAIN TABLET ORAL
Qty: 30 EACH | Refills: 1 | Status: ACTIVE | COMMUNITY

## 2024-02-14 RX ORDER — RIZATRIPTAN BENZOATE 10 MG/1
TAKE ONE TABLET BY MOUTH ONE TIME DAILY FOR MIGRAINES TABLET ORAL
Qty: 18 UNSPECIFIED | Refills: 0 | Status: ACTIVE | COMMUNITY

## 2024-02-14 RX ORDER — PANTOPRAZOLE SODIUM 40 MG/1
1 TABLET TABLET, DELAYED RELEASE ORAL TWICE A DAY
Qty: 60 | Refills: 3 | Status: ACTIVE | COMMUNITY

## 2024-02-14 RX ORDER — ROSUVASTATIN CALCIUM 20 MG/1
TABLET, FILM COATED ORAL
Refills: 0 | Status: ACTIVE | COMMUNITY
Start: 2020-10-14

## 2024-02-14 RX ORDER — HYDROXYZINE HYDROCHLORIDE 25 MG/1
TAKE ONE TABLET BY MOUTH AT BEDTIME TABLET, FILM COATED ORAL
Qty: 30 UNSPECIFIED | Refills: 0 | Status: ACTIVE | COMMUNITY

## 2024-02-14 NOTE — HPI-TODAY'S VISIT:
73-year-old female with history of Valentin's esophagus, colon polyps, family history of colon cancer in her mother presents for follow-up after EGD and colonoscopy on January 30, 2024 which demonstrated GERD and mild gastritis. Colonoscoy was normal. Results summarized below.   She was last seen in the office in December 2023 with complaints of frequent GERD symptoms and solid food dysphagia on high-dose pantoprazole.  Her pantoprazole was changed to esomeprazole 40 mg twice a day.  Barium swallow was done in November 2023 which showed moderate GERD but no stricture or mass.  She had recently been diagnosed with mild anemia and was started on iron supplementation by her PCP.  No labs were available for review at the time.  EGD and colonoscopy were recommended.  She had also reported having diarrhea after she began using magnesium supplements.  Her diarrhea resolved after she stopped taking magnesium supplementation.  She follows up today doing well. She had no problems following her procedures. She is doing much better on Nexium 40mg twice a day. She has heartburn on rare occasions if she does not drink enough water when swallowing her pills. She follows an anti reflux diet and lifestyle. She states she had labs done with her PCP and her Hgb had improved. She continues on iron supplementation. No lab results available.    EGD 1/30/2024.  Esophageal mucosal changes suspicious for short segment Valentin's esophagus was present in the distal esophagus.  The maximum longitudinal extent was 2 cm in length.  Distal esophageal biopsy showed reflux type changes.  No evidence of Valentin's esophagus.  Esophageal dilation was performed with a [place] dilator with no resistance at 54 [place].  Mild inflammation was found in the gastric antrum and in the prepyloric region of the stomach.  The duodenum appeared normal.  Colonoscopy 1/30/2024:Colonoscopy was normal to the terminal ileum with the exception of moderately tortuous colon and grade 1 internal hemorrhoids.  No specimens were collected.

## 2025-07-25 ENCOUNTER — TELEPHONE ENCOUNTER (OUTPATIENT)
Dept: URBAN - METROPOLITAN AREA CLINIC 63 | Facility: CLINIC | Age: 75
End: 2025-07-25

## 2025-08-15 ENCOUNTER — OFFICE VISIT (OUTPATIENT)
Dept: URBAN - METROPOLITAN AREA CLINIC 9 | Facility: CLINIC | Age: 75
End: 2025-08-15
Payer: MEDICARE

## 2025-08-15 ENCOUNTER — DASHBOARD ENCOUNTERS (OUTPATIENT)
Age: 75
End: 2025-08-15

## 2025-08-15 DIAGNOSIS — K22.70 BARRETT'S ESOPHAGUS WITHOUT DYSPLASIA: ICD-10-CM

## 2025-08-15 DIAGNOSIS — K58.2 IRRITABLE BOWEL SYNDROME WITH BOTH CONSTIPATION AND DIARRHEA: ICD-10-CM

## 2025-08-15 DIAGNOSIS — R14.0 ABDOMINAL BLOATING: ICD-10-CM

## 2025-08-15 DIAGNOSIS — K86.2 PANCREAS CYST: ICD-10-CM

## 2025-08-15 PROCEDURE — 99204 OFFICE O/P NEW MOD 45 MIN: CPT | Performed by: INTERNAL MEDICINE

## 2025-08-15 PROCEDURE — 99214 OFFICE O/P EST MOD 30 MIN: CPT | Performed by: INTERNAL MEDICINE

## 2025-08-15 RX ORDER — GABAPENTIN 100 MG/1
1 CAPSULE AT BEDTIME CAPSULE ORAL ONCE A DAY
Status: ACTIVE | COMMUNITY

## 2025-08-15 RX ORDER — WHEAT DEXTRIN 3 G/3.5 G
1 TEASPOON POWDER (GRAM) ORAL DAILY
Qty: 30 | Refills: 3 | OUTPATIENT
Start: 2025-08-15 | End: 2025-12-13

## 2025-08-15 RX ORDER — MELOXICAM 7.5 MG/1
TAKE 1 TABLET BY MOUTH DAILY AS NEEDED FOR PAIN TABLET ORAL
Qty: 30 EACH | Refills: 1 | Status: ACTIVE | COMMUNITY

## 2025-08-15 RX ORDER — ESOMEPRAZOLE MAGNESIUM 40 MG/1
1 CAPSULE CAPSULE, DELAYED RELEASE ORAL TWICE A DAY
OUTPATIENT
Start: 2023-12-20

## 2025-08-15 RX ORDER — TOPIRAMATE 50 MG/1
TABLET, FILM COATED ORAL
Qty: 90 TABLET | Status: ACTIVE | COMMUNITY

## 2025-08-15 RX ORDER — FLUTICASONE PROPIONATE 50 UG/1
SPRAY, METERED NASAL
Qty: 48 GRAM | Status: ACTIVE | COMMUNITY

## 2025-08-15 RX ORDER — CLONAZEPAM 1 MG/1
TABLET ORAL
Refills: 0 | Status: ACTIVE | COMMUNITY
Start: 2020-10-14

## 2025-08-15 RX ORDER — MULTIVITAMIN WITH IRON
1 TABLET WITH A MEAL TABLET ORAL ONCE A DAY
Status: ACTIVE | COMMUNITY

## 2025-08-15 RX ORDER — ROSUVASTATIN CALCIUM 20 MG/1
TABLET, FILM COATED ORAL
Refills: 0 | Status: ACTIVE | COMMUNITY
Start: 2020-10-14

## 2025-08-15 RX ORDER — FLUCONAZOLE 150 MG/1
TAKE ONE TABLET BY MOUTH AS A SINGLE ORAL DOSE TABLET ORAL
Qty: 1 UNSPECIFIED | Refills: 0 | Status: ON HOLD | COMMUNITY

## 2025-08-15 RX ORDER — RIZATRIPTAN BENZOATE 10 MG/1
TAKE ONE TABLET BY MOUTH ONE TIME DAILY FOR MIGRAINES TABLET ORAL
Qty: 18 UNSPECIFIED | Refills: 0 | Status: ACTIVE | COMMUNITY

## 2025-08-15 RX ORDER — HYDROXYZINE HYDROCHLORIDE 25 MG/1
TAKE ONE TABLET BY MOUTH AT BEDTIME TABLET, FILM COATED ORAL
Qty: 30 UNSPECIFIED | Refills: 0 | Status: ON HOLD | COMMUNITY

## 2025-08-15 RX ORDER — PRENATAL 168/IRON/FOLIC/OMEGA3 27-800-235
AS DIRECTED CAPSULE ORAL
Status: ON HOLD | COMMUNITY

## 2025-08-15 RX ORDER — PANTOPRAZOLE SODIUM 40 MG/1
1 TABLET TABLET, DELAYED RELEASE ORAL TWICE A DAY
Qty: 60 | Refills: 3 | Status: ON HOLD | COMMUNITY

## 2025-08-15 RX ORDER — ESOMEPRAZOLE MAGNESIUM 40 MG/1
1 CAPSULE CAPSULE, DELAYED RELEASE ORAL TWICE A DAY
Qty: 60 CAPSULE | Refills: 5 | Status: ACTIVE | COMMUNITY
Start: 2023-12-20

## 2025-08-15 RX ORDER — SERTRALINE HYDROCHLORIDE 150 MG/1
1 TABLET CAPSULE ORAL ONCE A DAY
Status: ACTIVE | COMMUNITY